# Patient Record
Sex: FEMALE | RURAL
[De-identification: names, ages, dates, MRNs, and addresses within clinical notes are randomized per-mention and may not be internally consistent; named-entity substitution may affect disease eponyms.]

---

## 2020-01-28 ENCOUNTER — APPOINTMENT (RX ONLY)
Dept: RURAL CLINIC 1 | Facility: CLINIC | Age: 65
Setting detail: DERMATOLOGY
End: 2020-01-28

## 2020-01-28 DIAGNOSIS — R20.2 PARESTHESIA OF SKIN: ICD-10-CM

## 2020-01-28 DIAGNOSIS — L84 CORNS AND CALLOSITIES: ICD-10-CM

## 2020-01-28 PROCEDURE — ? OTHER

## 2020-01-28 PROCEDURE — ? TREATMENT REGIMEN

## 2020-01-28 PROCEDURE — 99202 OFFICE O/P NEW SF 15 MIN: CPT

## 2020-01-28 PROCEDURE — ? COUNSELING

## 2020-01-28 ASSESSMENT — LOCATION DETAILED DESCRIPTION DERM
LOCATION DETAILED: LEFT DORSAL 4TH TOE
LOCATION DETAILED: LEFT LATERAL PLANTAR 4TH TOE
LOCATION DETAILED: LEFT DORSAL 5TH TOE

## 2020-01-28 ASSESSMENT — LOCATION SIMPLE DESCRIPTION DERM
LOCATION SIMPLE: LEFT 5TH TOE
LOCATION SIMPLE: PLANTAR SURFACE OF LEFT 4TH TOE
LOCATION SIMPLE: LEFT 4TH TOE

## 2020-01-28 ASSESSMENT — LOCATION ZONE DERM: LOCATION ZONE: TOE

## 2020-01-28 NOTE — PROCEDURE: OTHER
Other (Free Text): Discussed the callus that the pt has between the 4th and 5th toe. Due to the size of the callus, it is more than likely that this has been present for several months if not years. Discussed that she could try to remove the callus with the callus/corn remover bandages but I do not feel that this will resolve her symptoms. The pain that she is experiencing is on the top of both of the toes.  6 months ago she sprained her ankle. I wonder if this has lead to impingement of nerves innervating the 4th and 5th toes leading to her symptoms. Recommened referral to neurologist to see if they can assist with the proper test to further eval
Detail Level: Detailed
Note Text (......Xxx Chief Complaint.): This diagnosis correlates

## 2020-01-28 NOTE — HPI: RASH
Is This A New Presentation, Or A Follow-Up?: Rash
Additional History: She said this pain comes and goes and feels like blisters when she is wearing boots.